# Patient Record
Sex: FEMALE | Race: WHITE | NOT HISPANIC OR LATINO | Employment: OTHER | URBAN - METROPOLITAN AREA
[De-identification: names, ages, dates, MRNs, and addresses within clinical notes are randomized per-mention and may not be internally consistent; named-entity substitution may affect disease eponyms.]

---

## 2017-01-16 ENCOUNTER — OFFICE VISIT (OUTPATIENT)
Dept: CARDIOLOGY | Facility: CLINIC | Age: 72
End: 2017-01-16

## 2017-01-16 VITALS
HEART RATE: 78 BPM | DIASTOLIC BLOOD PRESSURE: 86 MMHG | SYSTOLIC BLOOD PRESSURE: 130 MMHG | WEIGHT: 220 LBS | HEIGHT: 65 IN | BODY MASS INDEX: 36.65 KG/M2

## 2017-01-16 DIAGNOSIS — I45.2 BIFASCICULAR BLOCK: Primary | ICD-10-CM

## 2017-01-16 DIAGNOSIS — R00.2 PALPITATIONS: ICD-10-CM

## 2017-01-16 PROCEDURE — 93280 PM DEVICE PROGR EVAL DUAL: CPT | Performed by: PHYSICIAN ASSISTANT

## 2017-01-16 PROCEDURE — 99213 OFFICE O/P EST LOW 20 MIN: CPT | Performed by: PHYSICIAN ASSISTANT

## 2017-01-16 RX ORDER — LISINOPRIL 10 MG/1
10 TABLET ORAL DAILY
COMMUNITY

## 2017-01-16 NOTE — MR AVS SNAPSHOT
Henrry JULIEN Anjelica   1/16/2017 10:45 AM   Office Visit    Dept Phone:  [phone]   Encounter #:  [id number]    Provider:  Emil Marquez DO   Department:  [facility] CARDIOLOGY                Your Full Care Plan              Today's Medication Changes          These changes are accurate as of: 1/16/17 11:50 AM.  If you have any questions, ask your nurse or doctor.               Stop taking medication(s)listed here:     bisoprolol 5 MG tablet   Commonly known as:  ZEBeta   Stopped by:  Emil Marquez DO                      Your Updated Medication List          This list is accurate as of: 1/16/17 11:50 AM.  Always use your most recent med list.                aspirin 325 MG tablet       flecainide 50 MG tablet   Commonly known as:  TAMBOCOR   Take 1 tablet by mouth 2 (two) times a day.       furosemide 20 MG tablet   Commonly known as:  LASIX       gabapentin 600 MG tablet   Commonly known as:  NEURONTIN       lisinopril 10 MG tablet   Commonly known as:  PRINIVIL,ZESTRIL       pravastatin 10 MG tablet   Commonly known as:  PRAVACHOL       tiZANidine 4 MG tablet   Commonly known as:  ZANAFLEX       vitamin D 59059 UNITS capsule capsule   Commonly known as:  ERGOCALCIFEROL               We Performed the Following     ECG 12 Lead       You Were Diagnosed With        Codes Comments    Bifascicular block    -  Primary ICD-10-CM: I45.2  ICD-9-CM: 426.53     Palpitations     ICD-10-CM: R00.2  ICD-9-CM: 785.1       Instructions     None    Patient Instructions History      Upcoming Appointments     Visit Type Date Time Department    FOLLOW UP 1/16/2017 10:45 AM [facility]    FOLLOW UP 7/17/2017 10:30 AM [facility]      MyChart Signup     Our records indicate that you have declined [facility] [employer] signup. If you would like to sign up for [employer], please email [email or web address] or call [phone] to obtain an activation code.         "       Other Info from Your Visit           Your Appointments     Jul 17, 2017 10:30 AM EDT   Follow Up with Emil Marquez DO   [facility] CARDIOLOGY (--)    [address]  [facility] [address] [phone]           Arrive 15 minutes prior to appointment.              Allergies     Codeine      Tetanus Toxoids      Vytorin [Ezetimibe-simvastatin]        Reason for Visit     Pacemaker Check     Irregular Heart Beat           Vital Signs     Blood Pressure Pulse Height Weight Last Menstrual Period Body Mass Index    130/86 (BP Location: Right arm, Patient Position: Sitting) 78 65\" (165.1 cm) 220 lb (99.8 kg) (LMP Unknown) 36.61 kg/m2    Smoking Status                   Former Smoker           Problems and Diagnoses Noted     Bifascicular block    Palpitations      Results     ECG 12 Lead               "

## 2017-01-16 NOTE — PROGRESS NOTES
Subjective:   Henrry Dejesus  1945  [phone]      01/16/2017    [facility] CARDIOLOGY    Juan Marmolejo MD  [address]  [place] [address]    REFERRING DOCTOR: JASMIN       Patient ID: Henrry Dejesus is a 71 y.o. female.    Chief Complaint:   Chief Complaint   Patient presents with   • Pacemaker Check   • Irregular Heart Beat     Problem List:    1. Nonobstructive coronary artery disease:  a. Left heart catheterization in 2008, nonobstructive disease.   b. Negative myocardial perfusion study, 07/09/2014, [facility], indicated for new right bundle branch block.  2. Biventricular block.  3. Syncope secondary to complete heart block:  a. Dual-chamber permanent pacemaker, Emil Marquez, 08/12/2014, Biotronik device DDD with lower rate of 60, upper rate 120.  b. Recent followup visit on 09/25/2014: Patient complaining of palpitations and increased heart rate; however, on device check at that time, no significant arrhythmias were noted with maximum heart rate of 99 beats per minute. Patient added on Zebeta 2.5 mg daily.  c. Patient had some issues with tachycardia with questionable pacemaker mediated tachycardia versus tracking of an atrial arrhythmia. Patient states she felt better after increasing the Zebeta to 5 mg daily; however, she still has these episodes, but in a more infrequent manner.   d. Patient states she only occasionally has palpitations; however, significant improvement since her previous office visit with adjustments of her device.   4. Cardiomyopathy:  a. Echocardiogram, 08/09/2014: LVEF 45% to 50% with diastolic dysfunction, mild to moderate TR and mild pulmonary hypertension. RVSP is 42 mmHg.  b. Echocardiogram in June 2015 showed an ejection fraction of 55% to 60% with mild mitral regurgitation and tricuspid regurgitation.  5. Recurrent tachypalpitations.   6. Hypertension.   7. Hyperlipidemia.   8. Glaucoma.    9. Osteoarthritis:  a. Hysterectomy with BSO.  10. Orthostatic hypotension.     Allergies   Allergen Reactions   • Codeine    • Tetanus Toxoids    • Vytorin [Ezetimibe-Simvastatin]        Current Outpatient Prescriptions:   •  aspirin 325 MG tablet, Take 325 mg by mouth daily., Disp: , Rfl:   •  flecainide (TAMBOCOR) 50 MG tablet, Take 1 tablet by mouth 2 (two) times a day. (Patient taking differently: Take 50 mg by mouth Daily.), Disp: 60 tablet, Rfl: 3  •  furosemide (LASIX) 20 MG tablet, Take 20 mg by mouth as needed., Disp: , Rfl:   •  gabapentin (NEURONTIN) 600 MG tablet, Take 600 mg by mouth 3 (three) times a day., Disp: , Rfl:   •  lisinopril (PRINIVIL,ZESTRIL) 10 MG tablet, Take 10 mg by mouth Daily., Disp: , Rfl:   •  pravastatin (PRAVACHOL) 10 MG tablet, Take 5 mg by mouth daily., Disp: , Rfl:   •  tiZANidine (ZANAFLEX) 4 MG tablet, Take 4 mg by mouth every night., Disp: , Rfl:   •  vitamin D (ERGOCALCIFEROL) 20668 UNITS capsule capsule, Take 50,000 Units by mouth 1 (one) time per week., Disp: , Rfl:     History of Present Illness    Patient presents today for follow-up of her bifascicular block s/p DDD PPM and atrial arrhythmias. She has been doing well since starting flecainide in terms of palpitations. She is taking 25mg in AM and 25mg in PM. No issues with chest pain, shortness of breath, fevers, chills, night sweats, PND, orthopnea or palpitations. No recent ER visits or hospital stays.      The following portions of the patient's history were reviewed and updated as appropriate: allergies, current medications, past family history, past medical history, past social history, past surgical history and problem list.    ROS   14 point ROS negative except as outlined in problem list, HPI and other parts of the note.      ECG 12 Lead  Date/Time: 1/16/2017 11:47 AM  Performed by: GREGORY LEBRON  Authorized by: GREGORY LEBRON   Comparison: compared with previous ECG   Similar to previous ECG  Rhythm: sinus  "rhythm and paced  Rate: normal  BPM: 60  Conduction: left bundle branch block  ST Segments: ST segments normal  T Waves: T waves normal  QRS axis: normal  Other: no other findings  Clinical impression: normal ECG               Objective:       Vitals:    01/16/17 1047   BP: 130/86   BP Location: Right arm   Patient Position: Sitting   Pulse: 78   Weight: 220 lb (99.8 kg)   Height: 65\" (165.1 cm)       GENERAL: Well-developed, well-nourished patient in no acute distress.  HEENT: Normocephalic, atraumatic, PERRLA. Moist mucous membranes.  NECK: No JVD present at 30°. No carotid bruits auscultated.  LUNGS: Clear to auscultation.  CARDIOVASCULAR: Heart has a regular rate and rhythm. No murmurs, gallops or rubs noted.   ABDOMEN: Soft, nontender. Positive bowel sounds.  MUSCULOSKELETAL: No gross deformities. No clubbing, cyanosis, or lower extremity edema.  SKIN: Pink, warm  Neuro: Nonfocal exam. Gait intact  Ext: No edema or bruising    The patient's old records including ambulatory rhythm recordings (ECGs, Holter/event monitor) were reviewed and discussed.      Lab Review:     Device Interrogation: Normal functioning DDD PPM. A paced 83%. V paced 0%. Stable thresholds and impedences. No events.       Diagnosis:   1. Bifascicular block    2. Palpitations    Assessment & Plan:     1. Bifascicular block s/p DDD PPM with normal function     2. Palpitations/PACs- much improved on Flecainide. Taking 25mg in Am and 25mg in PM due to side effects and will continue at that dose. Also continue Zebeta.     3. Follow-up in 6 months.          BARRIE Vela  01/16/17  11:39 AM      EMR Dragon/Transcription disclaimer:  Much of this encounter note is an electronic transcription/translation of spoken language to printed text. Electronic translation of spoken language may permit erroneous, or at times, nonsensical words or phrases to be inadvertently transcribed. Although I have reviewed the note for such errors, some may still " exist.

## 2017-05-08 RX ORDER — BISOPROLOL FUMARATE 5 MG/1
TABLET, FILM COATED ORAL
Qty: 135 TABLET | Refills: 2 | OUTPATIENT
Start: 2017-05-08

## 2017-07-17 ENCOUNTER — OFFICE VISIT (OUTPATIENT)
Dept: CARDIOLOGY | Facility: CLINIC | Age: 72
End: 2017-07-17

## 2017-07-17 VITALS
BODY MASS INDEX: 34.99 KG/M2 | HEART RATE: 63 BPM | SYSTOLIC BLOOD PRESSURE: 138 MMHG | HEIGHT: 65 IN | WEIGHT: 210 LBS | DIASTOLIC BLOOD PRESSURE: 86 MMHG

## 2017-07-17 DIAGNOSIS — I45.2 BIFASCICULAR BLOCK: ICD-10-CM

## 2017-07-17 DIAGNOSIS — I10 ESSENTIAL HYPERTENSION: ICD-10-CM

## 2017-07-17 DIAGNOSIS — R55 SYNCOPE, UNSPECIFIED SYNCOPE TYPE: Primary | ICD-10-CM

## 2017-07-17 PROCEDURE — 99213 OFFICE O/P EST LOW 20 MIN: CPT | Performed by: INTERNAL MEDICINE

## 2017-07-17 PROCEDURE — 93288 INTERROG EVL PM/LDLS PM IP: CPT | Performed by: INTERNAL MEDICINE

## 2017-07-17 RX ORDER — FLECAINIDE ACETATE 50 MG/1
50 TABLET ORAL DAILY
COMMUNITY

## 2017-07-17 RX ORDER — BISOPROLOL FUMARATE 5 MG/1
5 TABLET, FILM COATED ORAL DAILY
COMMUNITY

## 2017-07-17 NOTE — PROGRESS NOTES
Subjective:   Henrry Dejesus  1945  [phone]      07/17/2017    [facility] CARDIOLOGY    Juan Marmolejo MD  [address] [place] [address]    REFERRING DOCTOR: JASMIN      Patient ID: Henrry Dejesus is a 72 y.o. female.    Chief Complaint: High degree AV block s/p PPM      Problem List:     1. Nonobstructive coronary artery disease:  a. Left heart catheterization in 2008, nonobstructive disease.   b. Negative myocardial perfusion study, 07/09/2014, [facility], indicated for new right bundle branch block.  2. Biventricular block.  3. Syncope secondary to complete heart block:  a. Dual-chamber permanent pacemaker, Emil Marquez, 08/12/2014, Biotronik device DDD with lower rate of 60, upper rate 120.  b. Recent followup visit on 09/25/2014: Patient complaining of palpitations and increased heart rate; however, on device check at that time, no significant arrhythmias were noted with maximum heart rate of 99 beats per minute. Patient added on Zebeta 2.5 mg daily.  c. Patient had some issues with tachycardia with questionable pacemaker mediated tachycardia versus tracking of an atrial arrhythmia. Patient states she felt better after increasing the Zebeta to 5 mg daily; however, she still has these episodes, but in a more infrequent manner.   d. Patient states she only occasionally has palpitations; however, significant improvement since her previous office visit with adjustments of her device.   4. Cardiomyopathy:  a. Echocardiogram, 08/09/2014: LVEF 45% to 50% with diastolic dysfunction, mild to moderate TR and mild pulmonary hypertension. RVSP is 42 mmHg.  b. Echocardiogram in June 2015 showed an ejection fraction of 55% to 60% with mild mitral regurgitation and tricuspid regurgitation.  5. Recurrent tachypalpitations.   6. Hypertension.   7. Hyperlipidemia.   8. Glaucoma.   9. Osteoarthritis:  a. Hysterectomy with BSO.  10. Orthostatic hypotension.      Allergies   Allergen Reactions   • Codeine    • Tetanus Toxoids    • Vytorin [Ezetimibe-Simvastatin]        Current Outpatient Prescriptions:   •  aspirin 325 MG tablet, Take 325 mg by mouth daily., Disp: , Rfl:   •  bisoprolol (ZEBeta) 5 MG tablet, Take 5 mg by mouth Daily., Disp: , Rfl:   •  flecainide (TAMBOCOR) 50 MG tablet, Take 50 mg by mouth Daily., Disp: , Rfl:   •  furosemide (LASIX) 20 MG tablet, Take 20 mg by mouth as needed., Disp: , Rfl:   •  gabapentin (NEURONTIN) 600 MG tablet, Take 600 mg by mouth 3 (three) times a day., Disp: , Rfl:   •  lisinopril (PRINIVIL,ZESTRIL) 10 MG tablet, Take 10 mg by mouth Daily., Disp: , Rfl:   •  pravastatin (PRAVACHOL) 10 MG tablet, Take 5 mg by mouth daily., Disp: , Rfl:   •  tiZANidine (ZANAFLEX) 4 MG tablet, Take 4 mg by mouth every night., Disp: , Rfl:   •  vitamin D (ERGOCALCIFEROL) 20393 UNITS capsule capsule, Take 50,000 Units by mouth 1 (one) time per week., Disp: , Rfl:     History of Present Illness  Patient presents today for follow-up of her bifascicular block s/p DDD PPM and atrial arrhythmias. She has been doing well since starting flecainide in terms of palpitations. She is taking Flecainide 50 mg daily only.  No issues with chest pain, shortness of breath, fevers, chills, night sweats, PND, orthopnea or palpitations. No recent ER visits or hospital stays.  Patient still says that she feels palpitations vs RV pacing however deftly better since flecainide.  Questionable why she is only on once a day possibly some side effects on twice a day.    No issues with chest pain, shortness of breath, fevers, chills, night sweats, PND, orthopnea or palpitations. No recent ER visits or hospital stays.      The following portions of the patient's history were reviewed and updated as appropriate: allergies, current medications, past family history, past medical history, past social history, past surgical history and problem list.    ROS   14 point ROS negative  "except as outlined in problem list, HPI and other parts of the note.      ECG 12 Lead  Date/Time: 7/17/2017 11:43 AM  Performed by: MONY MIKE  Authorized by: MONY MIKE   Rhythm: paced  Comments: AP VS LBBB with  msec               Objective:       Vitals:    07/17/17 1100   BP: 138/86   BP Location: Left arm   Patient Position: Sitting   Pulse: 63   Weight: 210 lb (95.3 kg)   Height: 65\" (165.1 cm)       GENERAL: Well-developed, well-nourished patient in no acute distress.  HEENT: Normocephalic, atraumatic, PERRLA. Moist mucous membranes.  NECK: No JVD present at 30°. No carotid bruits auscultated.  LUNGS: Clear to auscultation.  CARDIOVASCULAR: Heart has a regular rate and rhythm. No murmurs, gallops or rubs noted.   ABDOMEN: Soft, nontender. Positive bowel sounds.  MUSCULOSKELETAL: No gross deformities. No clubbing, cyanosis, or lower extremity edema.  SKIN: Pink, warm  Neuro: Nonfocal exam. Gait intact  Ext: No edema or bruising  PPM site ok    The patient's old records including ambulatory rhythm recordings (ECGs, Holter/event monitor) were reviewed and discussed.      Lab Review:   Results for orders placed or performed during the hospital encounter of 08/11/14   CBC and Differential   Result Value Ref Range    WBC 9.65 3.50 - 10.80 K/mcL    RBC 3.87 (L) 3.89 - 5.14 M/mcL    Hemoglobin 11.3 (L) 11.5 - 15.5 g/dL    Hematocrit 35.0 34.5 - 44.0 %    MCV 90.4 80.0 - 99.0 fL    MCH 29.2 27.0 - 31.0 pg    MCHC 32.3 32.0 - 36.0 g/dL    RDW-CV 13.8 11.3 - 14.5 %    Platelets 384 150 - 450 K/mcL    Neutrophils Absolute 6.35 1.50 - 8.30 K/mcL    Lymphocytes Absolute 2.22 0.60 - 4.80 K/mcL    Monocytes Absolute 0.59 0.00 - 1.00 K/mcL    Eosinophils Absolute 0.41 (H) 0.10 - 0.30 K/mcL    Basophils Absolute 0.06 0.00 - 0.20 K/mcL    Neutrophil Rel % 65.9 41.0 - 71.0 %    Lymphocyte Rel % 23.0 (L) 24.0 - 44.0 %    Monocyte Rel % 6.1 0.0 - 12.0 %    Eosinophil Rel % 4.2 (H) 0.0 - 3.0 %    Basophil Rel % 0.6 " 0.0 - 1.0 %    Immature Granulocyte Rel % 0.2 0.0 - 0.6 %   Comprehensive metabolic panel   Result Value Ref Range    Glucose 143 (H) 70 - 100 mg/dL    BUN 18 9 - 23 mg/dL    Creatinine 0.7 0.6 - 1.3 mg/dL    Sodium 142 132 - 146 mmol/L    Potassium 4.3 3.5 - 5.5 mmol/L    Chloride 104 99 - 109 mmol/L    CO2 21 20 - 31 mmol/L    Calcium 10.0 8.7 - 10.4 mg/dL    Alkaline Phosphatase 70 25 - 100 Units/L    AST (SGOT) 30 0 - 33 Units/L    ALT (SGPT) 27 7 - 40 Units/L    Total Bilirubin 0.2 (L) 0.3 - 1.2 mg/dL    Total Protein 7.5 5.7 - 8.2 g/dL    Albumin 4.5 3.2 - 4.8 g/dL    eGFR 87 ml/min/1.732    Anion Gap 17 (H) 3 - 11 mmol/L   BNP   Result Value Ref Range    BNP 27 0 - 100 pg/mL   Amylase   Result Value Ref Range    Amylase 70 30 - 118 Units/L   Lipase   Result Value Ref Range    Lipase 38 6 - 51 Units/L   Protime-INR   Result Value Ref Range    Protime 10.7 9.6 - 11.5 Seconds    INR 1.00    APTT   Result Value Ref Range    PTT 22 (L) 24 - 31 Seconds   Rapid drug screen, urine   Result Value Ref Range    THC Screen Interpretation Negative NEGATIVE ng/mL    Phencyclidine (PCP), Urine Negative NEGATIVE ng/mL    Cocaine Screen, Urine Negative NEGATIVE ng/mL    Methamphetamine, Urine Negative NEGATIVE ng/mL    Opiate Screen, Urine Negative NEGATIVE ng/mL    Amphetamine, Urine Qual Negative NEGATIVE ng/mL    Benzodiazepine Screen, Urine Negative NEGATIVE ng/mL    TCA Screen Negative NEGATIVE ng/mL    Methadone Screen, Urine Negative NEGATIVE ng/mL    Barbiturates Screen, Urine Negative NEGATIVE ng/mL    Oxycodone Screen, Urine Negative NEGATIVE ng/mL    Propoxyphene Screen Negative NEGATIVE ng/mL    Buprenorphine, Screen, Urine Negative NEGATIVE ng/mL   Urinalysis Without Microscopic   Result Value Ref Range    Color, UA Yellow     Appearance, UA Clear     pH, UA 6.5 4.5 - 8.0    Specific Gravity, UA 1.011 1.001 - 1.030    Glucose, UA Negative NEGATIVE mg/dL    Ketones, UA Negative NEGATIVE    Bilirubin, UA Negative  NEGATIVE    Blood, UA Negative NEGATIVE    Protein, UA Negative NEGATIVE mg/dL    Nitrite, UA Negative NEGATIVE    Leukocytes, UA Small (A) NEGATIVE    Urobilinogen, UA 1.0 0.2 - 1.0 mg/dL   Urinalysis, Microscopic only   Result Value Ref Range    WBC, UA 6-12 (A) NONE SEEN,0-2 /hpf    RBC, UA 0-2 NONE SEEN,0-2 /hpf    Epithelial Cells, UA 0-2 NONE SEEN,0-2 /hpf    Bacteria, UA None Seen NONE SEEN /hpf    Hyaline Casts, UA 0-6 0 - 6 /hpf   Troponin   Result Value Ref Range    Troponin I 0.69 (C) 0.00 - 0.60 ng/mL   Hemoglobin A1C with EMG   Result Value Ref Range    Hemoglobin A1C 5.9 (H) 4.8 - 5.6 %    Mean Bld Glu Estim. 123 mg/dL   Troponin   Result Value Ref Range    Troponin I 0.62 (C) 0.00 - 0.60 ng/mL   Basic metabolic panel   Result Value Ref Range    Glucose 111 (H) 70 - 100 mg/dL    BUN 16 9 - 23 mg/dL    Creatinine 0.7 0.6 - 1.3 mg/dL    Sodium 140 132 - 146 mmol/L    Potassium 3.8 3.5 - 5.5 mmol/L    Chloride 104 99 - 109 mmol/L    CO2 30 20 - 31 mmol/L    Calcium 9.6 8.7 - 10.4 mg/dL    eGFR 83 ml/min/1.732    Anion Gap 6 3 - 11 mmol/L   Lipid panel   Result Value Ref Range    Total Cholesterol 168 0 - 200 mg/dL    Triglycerides 156 (H) 0 - 150 mg/dL    HDL Cholesterol 48 40 - 60 mg/dL    LDL Cholesterol  106 0 - 130 mg/dL   Heparin, Unfractionated   Result Value Ref Range    Heparin Unfractionated <0.05 Units/mL   Protime-INR   Result Value Ref Range    Protime 10.7 9.6 - 11.5 Seconds    INR 1.00    Platelet count   Result Value Ref Range    Platelets 358 150 - 450 K/mcL   Heparin, Unfractionated   Result Value Ref Range    Heparin Unfractionated 0.11 Units/mL   Heparin, Unfractionated   Result Value Ref Range    Heparin Unfractionated 0.15 Units/mL   Troponin   Result Value Ref Range    Troponin I 0.15 0.00 - 0.60 ng/mL   CBC and Differential   Result Value Ref Range    WBC 7.98 3.50 - 10.80 K/mcL    RBC 3.42 (L) 3.89 - 5.14 M/mcL    Hemoglobin 9.8 (L) 11.5 - 15.5 g/dL    Hematocrit 30.8 (L) 34.5 -  44.0 %    MCV 90.1 80.0 - 99.0 fL    MCH 28.7 27.0 - 31.0 pg    MCHC 31.8 (L) 32.0 - 36.0 g/dL    RDW-CV 13.9 11.3 - 14.5 %    Platelets 302 150 - 450 K/mcL    Neutrophils Absolute 3.14 1.50 - 8.30 K/mcL    Lymphocytes Absolute 3.75 0.60 - 4.80 K/mcL    Monocytes Absolute 0.63 0.00 - 1.00 K/mcL    Eosinophils Absolute 0.38 (H) 0.10 - 0.30 K/mcL    Basophils Absolute 0.06 0.00 - 0.20 K/mcL    Neutrophil Rel % 39.2 (L) 41.0 - 71.0 %    Lymphocyte Rel % 47.0 (H) 24.0 - 44.0 %    Monocyte Rel % 7.9 0.0 - 12.0 %    Eosinophil Rel % 4.8 (H) 0.0 - 3.0 %    Basophil Rel % 0.8 0.0 - 1.0 %    Immature Granulocyte Rel % 0.3 0.0 - 0.6 %   Basic metabolic panel   Result Value Ref Range    Glucose 97 70 - 100 mg/dL    BUN 11 9 - 23 mg/dL    Creatinine 0.6 0.6 - 1.3 mg/dL    Sodium 142 132 - 146 mmol/L    Potassium 3.8 3.5 - 5.5 mmol/L    Chloride 107 99 - 109 mmol/L    CO2 29 20 - 31 mmol/L    Calcium 9.5 8.7 - 10.4 mg/dL    eGFR 99 ml/min/1.732    Anion Gap 6 3 - 11 mmol/L   Cortisol   Result Value Ref Range    Cortisol 8.7 mcg/dL   TSH   Result Value Ref Range    TSH 0.657 0.350 - 5.350 UIU/mL   Vitamin B12   Result Value Ref Range    Vitamin B-12 240 211 - 911 pg/mL   Folate   Result Value Ref Range    Folate >24.0 (H) 3.2 - 20.0 ng/mL   Heparin, Unfractionated   Result Value Ref Range    Heparin Unfractionated 0.39 Units/mL   Heparin, Unfractionated   Result Value Ref Range    Heparin Unfractionated 0.39 Units/mL   Hemoglobin A1C with EMG   Result Value Ref Range    Hemoglobin A1C 5.9 (H) 4.8 - 5.6 %    Mean Bld Glu Estim. 123 mg/dL   POCT Glucose Fingerstick   Result Value Ref Range    Glucose 149 (H) 75 - 125 mg/dL   POCT Troponin, Rapid   Result Value Ref Range    Troponin I 0.00 0.00 - 0.60 ng/mL   POCT Troponin, Rapid   Result Value Ref Range    Troponin I 0.44 0.00 - 0.60 ng/mL   POCT Glucose Fingerstick   Result Value Ref Range    Glucose 100 75 - 125 mg/dL   POCT Glucose Fingerstick   Result Value Ref Range    Glucose  104 75 - 125 mg/dL   POCT Glucose Fingerstick   Result Value Ref Range    Glucose 105 75 - 125 mg/dL   POCT Glucose Fingerstick   Result Value Ref Range    Glucose 143 (H) 75 - 125 mg/dL   POCT Glucose Fingerstick   Result Value Ref Range    Glucose 101 75 - 125 mg/dL   POCT Glucose Fingerstick   Result Value Ref Range    Glucose 112 75 - 125 mg/dL   POCT Glucose Fingerstick   Result Value Ref Range    Glucose 123 75 - 125 mg/dL   POCT Glucose Fingerstick   Result Value Ref Range    Glucose 114 75 - 125 mg/dL   POCT Glucose Fingerstick   Result Value Ref Range    Glucose 139 (H) 75 - 125 mg/dL   POCT Glucose Fingerstick   Result Value Ref Range    Glucose 111 75 - 125 mg/dL   POCT Glucose Fingerstick   Result Value Ref Range    Glucose 124 75 - 125 mg/dL   POCT Glucose Fingerstick   Result Value Ref Range    Glucose 124 75 - 125 mg/dL   POCT Glucose Fingerstick   Result Value Ref Range    Glucose 104 75 - 125 mg/dL   POCT Glucose Fingerstick   Result Value Ref Range    Glucose 107 75 - 125 mg/dL   POCT Glucose Fingerstick   Result Value Ref Range    Glucose 103 75 - 125 mg/dL   POCT Glucose Fingerstick   Result Value Ref Range    Glucose 101 75 - 125 mg/dL   POCT Glucose Fingerstick   Result Value Ref Range    Glucose 85 75 - 125 mg/dL   POCT Glucose Fingerstick   Result Value Ref Range    Glucose 98 75 - 125 mg/dL   POCT Glucose Fingerstick   Result Value Ref Range    Glucose 86 75 - 125 mg/dL   Biotronik dual-chamber permanent pacemaker device check.  Patient currently is in sinus rhythm.  Right atria paced 79% RV paced 0%.  P waves 2.1 mV R waves 12.5 mV.  RA and RV lead threshold impedances within normal limits.  1 mode switch 4 seconds.  No changes or other events.        Diagnosis:   1. Syncope, unspecified syncope type  2. Bifascicular block  3. Essential hypertension  4. S/p PPM      Assessment & Plan:   1. Bifascicular block s/p DDD PPM with normal function as noted above     2. Palpitations/PACs- much  improved on Flecainide and Zebeta however still having palpitations.  She is currently only taking flecainide once a day.  She thinks may be questionable had side effects however is willing to try the flecainide 50 mg twice a day and.  We'll check an EKG on Wednesday and call us in the next couple weeks to see her palpitations are now that she be taken of flecainide twice a day.     3. Follow-up in 6 months.          CC: Dr Harlan Mraquez,   07/17/17  11:41 AM      EMR Dragon/Transcription disclaimer:  Much of this encounter note is an electronic transcription/translation of spoken language to printed text. Electronic translation of spoken language may permit erroneous, or at times, nonsensical words or phrases to be inadvertently transcribed. Although I have reviewed the note for such errors, some may still exist.